# Patient Record
Sex: FEMALE | Race: WHITE | NOT HISPANIC OR LATINO | URBAN - METROPOLITAN AREA
[De-identification: names, ages, dates, MRNs, and addresses within clinical notes are randomized per-mention and may not be internally consistent; named-entity substitution may affect disease eponyms.]

---

## 2021-01-02 ENCOUNTER — NURSE TRIAGE (OUTPATIENT)
Dept: OTHER | Facility: OTHER | Age: 21
End: 2021-01-02

## 2021-01-02 DIAGNOSIS — Z11.59 SPECIAL SCREENING EXAMINATION FOR UNSPECIFIED VIRAL DISEASE: Primary | ICD-10-CM

## 2021-01-02 DIAGNOSIS — Z11.59 SPECIAL SCREENING EXAMINATION FOR UNSPECIFIED VIRAL DISEASE: ICD-10-CM

## 2021-01-02 PROCEDURE — U0003 INFECTIOUS AGENT DETECTION BY NUCLEIC ACID (DNA OR RNA); SEVERE ACUTE RESPIRATORY SYNDROME CORONAVIRUS 2 (SARS-COV-2) (CORONAVIRUS DISEASE [COVID-19]), AMPLIFIED PROBE TECHNIQUE, MAKING USE OF HIGH THROUGHPUT TECHNOLOGIES AS DESCRIBED BY CMS-2020-01-R: HCPCS | Performed by: FAMILY MEDICINE

## 2021-01-02 NOTE — TELEPHONE ENCOUNTER
Regarding: Covid/Exposure/Symptomatic  ----- Message from Keo Peoples sent at 1/2/2021  2:57 PM EST -----  "Someone at work was positive and on Wednesday I had a 102 fever and then it went down and I still have body aches and a headache "

## 2021-01-02 NOTE — TELEPHONE ENCOUNTER
Reason for Disposition   COVID-19 Home Isolation, questions about    Answer Assessment - Initial Assessment Questions  1  COVID-19 DIAGNOSIS: "Who made your Coronavirus (COVID-19) diagnosis?" "Was it confirmed by a positive lab test?" If not diagnosed by a HCP, ask "Are there lots of cases (community spread) where you live?" (See public health department website, if unsure)      symptoms  2  COVID-19 EXPOSURE: "Was there any known exposure to COVID before the symptoms began?" CDC Definition of close contact: within 6 feet (2 meters) for a total of 15 minutes or more over a 24-hour period  *No Answer*  3  ONSET: "When did the COVID-19 symptoms start?"       12/30/20  4  WORST SYMPTOM: "What is your worst symptom?" (e g , cough, fever, shortness of breath, muscle aches)      *No Answer*  5  COUGH: "Do you have a cough?" If so, ask: "How bad is the cough?"        *No Answer*  6  FEVER: "Do you have a fever?" If so, ask: "What is your temperature, how was it measured, and when did it start?"      *No Answer*  7  RESPIRATORY STATUS: "Describe your breathing?" (e g , shortness of breath, wheezing, unable to speak)       *No Answer*  8  BETTER-SAME-WORSE: "Are you getting better, staying the same or getting worse compared to yesterday?"  If getting worse, ask, "In what way?"      *No Answer*  9   HIGH RISK DISEASE: "Do you have any chronic medical problems?" (e g , asthma, heart or lung disease, weak immune system, obesity, etc )      *No Answer*  10  PREGNANCY: "Is there any chance you are pregnant?" "When was your last menstrual period?"        *No Answer*  11  OTHER SYMPTOMS: "Do you have any other symptoms?"  (e g , chills, fatigue, headache, loss of smell or taste, muscle pain, sore throat; new loss of smell or taste especially support the diagnosis of COVID-19)        Fever, body aches, headache    Protocols used: CORONAVIRUS (COVID-19) DIAGNOSED OR SUSPECTED-ADULT-AH

## 2021-01-04 ENCOUNTER — TELEPHONE (OUTPATIENT)
Dept: CARDIOLOGY CLINIC | Facility: CLINIC | Age: 21
End: 2021-01-04

## 2021-01-04 LAB — SARS-COV-2 RNA SPEC QL NAA+PROBE: DETECTED

## 2021-01-04 NOTE — TELEPHONE ENCOUNTER
Spoke with pt, aware of results  Declines assistance with establishing with PCP at this time  Your test for the novel coronavirus, also known as COVID-19, was positive  The sample showed that the virus was present  Positive COVID-19 test results are reportable to the [place] Department of Health  You may receive a call from trained public health staff to conduct an interview  It is important to answer their call  They will ask you to verify who you are  During the call they will ask you about what symptoms you have, what you did before you got sick, and who you were close to while sick  The health department does this to make sure everyone stays healthy and to reduce the spread of the virus  If you would like to verify if the caller does in fact work in contact tracing, call the [address] [place] [address] at [employer] ([phone])  For additional information, please visit the [facility]  website: [email or web address] health [email or web address] gov     If you have any additional questions, we can schedule a virtual visit for you with a provider or call the [facility]line [phone], option 7, for care advice    For additional information, please visit the Coronavirus FAQ on the [facility] home page ([facility]  org)

## 2022-07-11 ENCOUNTER — OFFICE VISIT (OUTPATIENT)
Dept: URGENT CARE | Facility: MEDICAL CENTER | Age: 22
End: 2022-07-11

## 2022-07-11 VITALS
OXYGEN SATURATION: 100 % | HEART RATE: 72 BPM | DIASTOLIC BLOOD PRESSURE: 58 MMHG | RESPIRATION RATE: 16 BRPM | SYSTOLIC BLOOD PRESSURE: 114 MMHG | TEMPERATURE: 99.1 F

## 2022-07-11 DIAGNOSIS — S69.91XA INJURY TO FINGERNAIL OF RIGHT HAND, INITIAL ENCOUNTER: Primary | ICD-10-CM

## 2022-07-11 PROCEDURE — G0382 LEV 3 HOSP TYPE B ED VISIT: HCPCS

## 2022-07-11 PROCEDURE — 11730 AVULSION NAIL PLATE SIMPLE 1: CPT

## 2022-07-11 NOTE — PATIENT INSTRUCTIONS
Nail Fungus   Nail removed  Keep wound covered & dry  Follow up with your primary doctor this week  Monitor for fungal infection ie discoloration, drainage, swelling, increased pain, etc   Proceed to ER if symptoms worsen  WHAT YOU NEED TO KNOW:   What is nail fungus? Nail fungus, or onychomycosis, is a fungal infection in your toenail or fingernail  Nail fungus is more common in toenails than fingernails  The cause of the infection may not be known  What increases my risk for nail fungus? Athlete's foot    Age 61 years or older    Conditions such as diabetes, circulation problems, or a weak immune system    Wearing heavy work boots that make your feet warm and sweaty    Walking barefoot in locker rooms or public showers    Working in a job where your hands are often wet (dishwashers, house )    An unhealthy nail or underlying nail deformity    What are the signs and symptoms of nail fungus? Nails that curl up or down or are misshapen    Discolored (often white, yellow, or brown) nails    Fragile or cracked nails    Thick nails or nails with rough, jagged edges    Nail that is  from the nail bed    Tenderness or pain in the affected nail    How is nail fungus diagnosed? Your healthcare provider can usually diagnose nail fungus by examining your nails  A small nail clipping may be examined or sent to a lab to test for infection  How is nail fungus treated? Antifungal medicine is a pill that treats a fungal infection  You may need to take this medicine for up to 12 weeks  Topical treatments include creams and polishes that you apply to the top of your nail  You may need to use topical treatments for up to 1 year before you see positive results  Ask your healthcare provider for more information about antifungal medicine  How can I manage my symptoms? Use antifungal sprays or powders  You can buy these at your local drugstore  Keep your nails short  and file down any thick areas   Use separate nail trimmers and files for infected nails and healthy nails  If you go to a salon to get your nails done, bring your own nail files and trimmers  How can I help prevent nail fungus? Dry your feet with a towel  or hair dryer after you bathe  Do not wear tight-fitting shoes  or shoes that pinch your toes  Avoid shoes made from rubber or plastic  Wear socks that absorb moisture  This includes socks make of wool, nylon, or polypropylene  Do not wear cotton socks  Change your socks if they are damp from sweat or your feet get wet  Put on dry, clean socks every day  Do not go barefoot  in locker rooms or public showers  Do not use nail polish  or artificial nails such as acrylic or gel nails  Wear gloves that are waterproof  if you work with water  When should I contact my healthcare provider? You have questions or concerns about your condition or care  CARE AGREEMENT:   You have the right to help plan your care  Learn about your health condition and how it may be treated  Discuss treatment options with your healthcare providers to decide what care you want to receive  You always have the right to refuse treatment  The above information is an  only  It is not intended as medical advice for individual conditions or treatments  Talk to your doctor, nurse or pharmacist before following any medical regimen to see if it is safe and effective for you  © Copyright [employer] 2022 Information is for End User's use only and may not be sold, redistributed or otherwise used for commercial purposes   All illustrations and images included in CareNotes® are the copyrighted property of [employer]  or [address] [place] [address]

## 2022-07-12 NOTE — PROGRESS NOTES
[facility]        NAME: Breezy Dorado is a 25 y o  female  : 2000    MRN: [id number]  DATE: 2022  TIME: 8:11 PM    Assessment and Plan   Injury to fingernail of right hand, initial encounter Cami Borrero  1  Injury to fingernail of right hand, initial encounter  Nail removal         Patient Instructions     Acrylic nail removed  Wound dressed  Instructed to follow up with family doctor and monitor for signs of infection  Follow up with PCP in 2-3 days  Proceed to ER if symptoms worsen  Chief Complaint     Chief Complaint   Patient presents with    fingernail injury     Right middle finger artificial nail broke and injured natural nail beneath  History of Present Illness     25 y o  F presents with complaint of pain to R middle finger after cracking acrylic nail approx 1 hour ago  States she hit the nail off of an object, causing it to partially break off and tear the nail beneath  Review of Systems   Review of Systems   Constitutional: Negative for chills, fatigue and fever  HENT: Negative for congestion, ear pain, facial swelling, hearing loss, rhinorrhea, sinus pressure, sneezing, sore throat and trouble swallowing  Eyes: Negative for pain, redness and visual disturbance  Respiratory: Negative for cough, chest tightness, shortness of breath and wheezing  Cardiovascular: Negative for chest pain and palpitations  Gastrointestinal: Negative for abdominal pain, diarrhea, nausea and vomiting  Genitourinary: Negative for dysuria, flank pain, hematuria and pelvic pain  Musculoskeletal: Negative for arthralgias, back pain and myalgias  Skin: Positive for wound  Negative for color change and rash  Neurological: Negative for dizziness, seizures, syncope, weakness, light-headedness and headaches  Psychiatric/Behavioral: Negative for confusion, hallucinations and sleep disturbance  The patient is not nervous/anxious      All other systems reviewed and are negative  Current Medications       Current Outpatient Medications:     etonogestrel (NEXPLANON) subdermal implant, Inject 68 mg under the skin, Disp: , Rfl:     Current Allergies     Allergies as of 07/11/2022    (No Known Allergies)            The following portions of the patient's history were reviewed and updated as appropriate: allergies, current medications, past family history, past medical history, past social history, past surgical history and problem list      History reviewed  No pertinent past medical history  History reviewed  No pertinent surgical history  History reviewed  No pertinent family history  Medications have been verified  Objective   /58   Pulse 72   Temp 99 1 °F (37 3 °C)   Resp 16   SpO2 100%   No LMP recorded  Physical Exam     Physical Exam  Vitals reviewed  Constitutional:       General: She is not in acute distress  Appearance: Normal appearance  She is not toxic-appearing  HENT:      Head: Normocephalic  Right Ear: Tympanic membrane normal  Tympanic membrane is not erythematous or bulging  Left Ear: Tympanic membrane normal  Tympanic membrane is not erythematous or bulging  Nose: No congestion or rhinorrhea  Right Sinus: No maxillary sinus tenderness or frontal sinus tenderness  Left Sinus: No maxillary sinus tenderness or frontal sinus tenderness  Mouth/Throat:      Pharynx: Uvula midline  No oropharyngeal exudate, posterior oropharyngeal erythema or uvula swelling  Tonsils: No tonsillar exudate or tonsillar abscesses  Eyes:      Extraocular Movements: Extraocular movements intact  Conjunctiva/sclera: Conjunctivae normal       Pupils: Pupils are equal, round, and reactive to light  Cardiovascular:      Rate and Rhythm: Normal rate and regular rhythm  Pulses: Normal pulses  Heart sounds: Normal heart sounds     Pulmonary:      Effort: Pulmonary effort is normal  No tachypnea or respiratory distress  Breath sounds: Normal breath sounds and air entry  No decreased breath sounds, wheezing, rhonchi or rales  Abdominal:      General: Bowel sounds are normal       Palpations: Abdomen is soft  Tenderness: There is no abdominal tenderness  There is no right CVA tenderness or guarding  Musculoskeletal:         General: Normal range of motion  Cervical back: Normal range of motion  Lymphadenopathy:      Cervical: No cervical adenopathy  Skin:     General: Skin is warm and dry  Findings: Wound present  Neurological:      General: No focal deficit present  Mental Status: She is alert  Cranial Nerves: Cranial nerves are intact  Sensory: Sensation is intact  Motor: Motor function is intact  Coordination: Coordination is intact  Gait: Gait is intact  Deep Tendon Reflexes: Reflexes are normal and symmetric  Nail removal    Date/Time: 7/11/2022 8:09 PM  Performed by: JANNET Jeffers  Authorized by: Avery Jeffers     Patient location:  BedsideUniversal Protocol:  Consent: Verbal consent obtained  Risks and benefits: risks, benefits and alternatives were discussed  Consent given by: patient  Time out: Immediately prior to procedure a "time out" was called to verify the correct patient, procedure, equipment, support staff and site/side marked as required  Patient understanding: patient states understanding of the procedure being performed      Location:     Hand:  R long finger  Pre-procedure details:     Skin preparation:  Betadine  Anesthesia (see MAR for exact dosages): Anesthesia method: topical lidocaine    Nail Removal:     Nail removed:  Partial    Nail bed sutured: no      Removed nail replaced and anchored: no    Ingrown nail:     Wedge excision of skin: no      Nail matrix removed or ablated:  None  Nails trimmed:     Number of nails trimmed:  1  Post-procedure details:     Dressing:  Antibiotic ointment and 4x4 sterile gauze    Patient tolerance of procedure:   Tolerated well, no immediate complications  Comments:      Acrylic nail removed fully with nail clipper; remaining 1/3 of original nail in place; cleansed and dressed